# Patient Record
Sex: FEMALE | Race: BLACK OR AFRICAN AMERICAN | Employment: OTHER | URBAN - METROPOLITAN AREA
[De-identification: names, ages, dates, MRNs, and addresses within clinical notes are randomized per-mention and may not be internally consistent; named-entity substitution may affect disease eponyms.]

---

## 2017-03-25 ENCOUNTER — APPOINTMENT (OUTPATIENT)
Dept: GENERAL RADIOLOGY | Facility: CLINIC | Age: 37
End: 2017-03-25
Attending: INTERNAL MEDICINE
Payer: COMMERCIAL

## 2017-03-25 ENCOUNTER — HOSPITAL ENCOUNTER (EMERGENCY)
Facility: CLINIC | Age: 37
Discharge: HOME | End: 2017-03-25
Attending: INTERNAL MEDICINE | Admitting: INTERNAL MEDICINE
Payer: COMMERCIAL

## 2017-03-25 VITALS
HEIGHT: 64 IN | WEIGHT: 150 LBS | SYSTOLIC BLOOD PRESSURE: 105 MMHG | HEART RATE: 78 BPM | TEMPERATURE: 98 F | DIASTOLIC BLOOD PRESSURE: 66 MMHG | RESPIRATION RATE: 16 BRPM | OXYGEN SATURATION: 100 % | BODY MASS INDEX: 25.61 KG/M2

## 2017-03-25 DIAGNOSIS — M72.2 PLANTAR FASCIITIS: ICD-10-CM

## 2017-03-25 PROCEDURE — 99284 EMERGENCY DEPT VISIT MOD MDM: CPT

## 2017-03-25 PROCEDURE — 25000132 ZZH RX MED GY IP 250 OP 250 PS 637: Performed by: INTERNAL MEDICINE

## 2017-03-25 PROCEDURE — 73630 X-RAY EXAM OF FOOT: CPT | Mod: LT

## 2017-03-25 RX ORDER — HYDROCODONE BITARTRATE AND ACETAMINOPHEN 5; 325 MG/1; MG/1
1-2 TABLET ORAL EVERY 4 HOURS PRN
Qty: 15 TABLET | Refills: 0 | Status: SHIPPED | OUTPATIENT
Start: 2017-03-25

## 2017-03-25 RX ORDER — IBUPROFEN 800 MG/1
800 TABLET, FILM COATED ORAL EVERY 8 HOURS PRN
Qty: 15 TABLET | Refills: 0 | Status: SHIPPED | OUTPATIENT
Start: 2017-03-25

## 2017-03-25 RX ORDER — IBUPROFEN 600 MG/1
600 TABLET, FILM COATED ORAL ONCE
Status: COMPLETED | OUTPATIENT
Start: 2017-03-25 | End: 2017-03-25

## 2017-03-25 RX ADMIN — IBUPROFEN 600 MG: 600 TABLET, FILM COATED ORAL at 12:59

## 2017-03-25 NOTE — ED NOTES
Patient complaining of left leg pain from the hip down to the foot.  Started with some pain yesterday but today is unable to bear weight at all. Denies any trauma.  Denies previous back injury.  Denies any recent long car or plane trips.     ABCs intact.  Alert and oriented x 3.

## 2017-03-25 NOTE — ED PROVIDER NOTES
"  History     Chief Complaint:  Foot and Ankle Pain      HPI   Liliana Pal is a 37 year old female who presents with foot and ankle pain. The patient reports the onset of pain in her left foot and ankle yesterday that has worsened today. She reports that she has been exercising on the treadmill for the last few days, but she denies injury or sudden onset of pain while exercising. She has been able to ambulate, however she reports exacerbation of her pain when bearing weight with radiation of her pain up her leg. She has not taken pain medication at home. Patient is otherwise healthy.    Allergies:  NKDA     Medications:    Tramadol    Past Medical History:    History reviewed. No pertinent past medical history.      Past Surgical History:     section    Family History:    History reviewed. No pertinent family history.      Social History:  Marital status:   Never smoker, negative for alcohol use.  The patient presents with .    Review of Systems   Musculoskeletal:        Positive for left foot and ankle pain.   All other systems reviewed and are negative.    Physical Exam   First Vitals:  BP: 104/64  Pulse: 78  Temp: 98  F (36.7  C)  Resp: 16  Height: 162.6 cm (5' 4\")  Weight: 68 kg (150 lb)  SpO2: 100 %      Physical Exam   Constitutional: She is cooperative.   Cardiovascular: Normal pulses.    Musculoskeletal:        Left foot: There is tenderness. There is no swelling and no deformity.   Marked tenderness over origin of plantar fascia  No ecchymosis, swelling, deformity  Some tenderness over lateral ankle ligaments   Neurological: She is alert. No sensory deficit.   Skin: No abrasion, no ecchymosis and no laceration noted.       Emergency Department Course     Imaging:  Radiographic findings were communicated with the patient who voiced understanding of the findings.    Left Foot XR per radiology:   Negative.     Interventions:  1259: Ibuprofen, 600 mg, PO     ED Course:  Nursing notes " and vitals reviewed.  I performed an exam of the patient as documented above.     1305: I checked in with and updated the patient.    I personally reviewed the imaging results with the patient and answered all related questions prior to discharge.   Findings and plan explained to the patient. Patient discharged home with instructions regarding supportive care, medications, and reasons to return. The importance of close follow-up was reviewed.     Impression & Plan      Medical Decision Making:  Liliana Pal is a 37 year old female who recently began treadmill exercising and presents with marked pain in the left foot and ankle. On exam, she has exquisite tenderness over the plantar fascia and with her history of pain essentially only with weightbearing that is suggestive of that diagnosis. She does additionally have some tenderness about the lateral ligaments of the ankle and there may be some element of sprain. As noted, we have placed her in a CAM walker boot with crutches as needed. I will have her on ibuprofen with Norco for pain, ice and elevate, follow up with podiatry early next week.    Diagnosis:    ICD-10-CM    1. Plantar fasciitis M72.2      Disposition:   Discharge to home with primary care and podiatry follow up.     Discharge Medications:   New Prescriptions    HYDROCODONE-ACETAMINOPHEN (NORCO) 5-325 MG PER TABLET    Take 1-2 tablets by mouth every 4 hours as needed for moderate to severe pain    IBUPROFEN (ADVIL/MOTRIN) 800 MG TABLET    Take 1 tablet (800 mg) by mouth every 8 hours as needed for moderate pain     Lindsay BRISENO, michelle serving as a scribe on 3/25/2017 at 12:14 PM to personally document services performed by Neena Porter MD, based on my observations and the provider's statements to me.           Neena Porter MD  03/25/17 1606

## 2017-03-25 NOTE — DISCHARGE INSTRUCTIONS

## 2017-03-25 NOTE — ED AVS SNAPSHOT
[facility] Emergency Department    [address]    [facility] [address]    Phone:  [phone]    Fax:  [phone]                                       Liliana Pal   MRN: [id number]    Department:  [facility] Emergency Department   Date of Visit:  3/25/2017           Patient Information     Date Of Birth          1980        Your diagnoses for this visit were:     Plantar fasciitis        You were seen by Neena Porter MD.      Follow-up Information     Follow up with Rachell Church DPM, Podiatry/Foot and Ankle Surgery In 3 days.    Specialty:  Podiatry    Contact information:     [facility]  [address]  [address]ON[address]  [facility] [id number] [phone]          Discharge Instructions         Plantar Fasciitis  Plantar fasciitis is a painful swelling of the plantar fascia. The plantar fascia is a thick, fibrous layer of tissue. It covers the bones on the bottom of your foot. And it supports the foot bones in an arched position.  This can happen gradually or suddenly. It usually affects one foot at a time. Heel pain can be sharp, like a knife sticking into the bottom of your foot. You may feel pain after exercising, long-distance jogging, stair climbing, long periods of standing, or after standing up.  Risk factors include: non-active lifestyle, arthritis, diabetes, obesity or recent weight gain, flat foot, high arch. Wearing high heels, loose shoes, or shoes with poor arch support for long periods of time adds to the risk. This problem is commonly found in runners and dancers. It also found in people who stand on hard surfaces for long periods of time.    Foot pain from this condition is usually worse in the morning. But it improves with walking. By the end of the day there may be a dull aching. Treatment requires short-term rest and controlling swelling. It may take up to 9 months before all symptoms go away. Rarely, a steroid injection into  the foot, or surgery, may be needed.  Home care    If you are overweight, lose weight to help healing.    Choose supportive shoes with good arch support and shock absorbency. Replace athletic shoes when they become worn out. Don t walk or run barefoot.    Premade or custom-fitted shoe inserts may be helpful. Inserts made of silicone seem to be the most effective. Custom-made inserts can be provided by a podiatrist or foot specialist, physical therapist, or orthopedist.    Premade or custom-made night splints keep the heel stretched out while you sleep. They may prevent morning pain.    Avoid activities that stress the feet: jogging, prolonged standing or walking, contact sports, etc.    First thing in the morning and before sports, stretch the bottom of your feet. Gently flex your ankle so the toes move toward your knee.    Icing may help control heel pain. Apply an ice pack to the heel for 10-20 minutes as a preventive. Or ice your heel after a severe flare-up of symptoms. You may repeat this every 1-2 hours as needed.    You may use over-the-counter pain medicine to control pain, unless another medicine was prescribed. Anti-inflammatory pain medicines, such as ibuprofen or naproxen, may work better than acetaminophen. If you have chronic liver or kidney disease or ever had a stomach ulcer or GI bleeding, talk with your healthcare provider before using these medicines.  Follow-up care   Follow up with your healthcare provider, physical therapist, or podiatrist or foot specialist as advised.  Call for an appointment if pain worsens or there is no relief after a few weeks of home treatment. Shoe inserts, a night splint, or a special boot may be required.  If X-rays were taken, you will be told of any new findings that may affect your care.  When to seek medical advice  Call your healthcare provider right away if any of these occur:     Foot swelling    Redness with increasing pain    3480-8478 [employer],  [employer]. [address] [place] [address]92. All rights reserved. This information is not intended as a substitute for professional medical care. Always follow your healthcare professional's instructions.          24 Hour Appointment Hotline       To make an appointment at any [facility], call [phone] ([phone]). If you don't have a family doctor or clinic, we will help you find one. [facility] clinics are conveniently located to serve the needs of you and your family.             Review of your medicines      START taking        Dose / Directions Last dose taken    HYDROcodone-acetaminophen 5-325 MG per tablet   Commonly known as:  NORCO   Dose:  1-2 tablet   Quantity:  15 tablet        Take 1-2 tablets by mouth every 4 hours as needed for moderate to severe pain   Refills:  0        ibuprofen 800 MG tablet   Commonly known as:  ADVIL/MOTRIN   Dose:  800 mg   Quantity:  15 tablet        Take 1 tablet (800 mg) by mouth every 8 hours as needed for moderate pain   Refills:  0          Our records show that you are taking the medicines listed below. If these are incorrect, please call your family doctor or clinic.        Dose / Directions Last dose taken    NO ACTIVE MEDICATIONS        Refills:  0        prenatal multivitamin  plus iron 27-0.8 MG Tabs per tablet   Dose:  1 tablet        Take 1 tablet by mouth daily.   Refills:  0        traMADol 50 MG tablet   Commonly known as:  ULTRAM   Dose:  1-2 tablet   Quantity:  15 tablet        Take 1-2 tablets by mouth every 6 hours as needed for pain.   Refills:  0                Prescriptions were sent or printed at these locations (2 Prescriptions)                   Other Prescriptions                Printed at Department/Unit printer (2 of 2)         ibuprofen (ADVIL/MOTRIN) 800 MG tablet               HYDROcodone-acetaminophen (NORCO) 5-325 MG per tablet                Procedures and tests performed during your visit     Foot XR, G/E 3 views, left       Orders Needing Specimen Collection     None      Pending Results     No orders found from 3/23/2017 to 3/26/2017.            Pending Culture Results     No orders found from 3/23/2017 to 3/26/2017.             Test Results from your hospital stay     3/25/2017 12:38 PM - Interface, Radiant Ib      Narrative     XR FOOT LT G/E 3 VW  3/25/2017 12:28 PM    HISTORY:  foot pain    COMPARISON:  None.        Impression     IMPRESSION:  Negative.      RADHA WEBBER MD                Clinical Quality Measure: Blood Pressure Screening     Your blood pressure was checked while you were in the emergency department today. The last reading we obtained was  BP: 105/66 . Please read the guidelines below about what these numbers mean and what you should do about them.  If your systolic blood pressure (the top number) is less than 120 and your diastolic blood pressure (the bottom number) is less than 80, then your blood pressure is normal. There is nothing more that you need to do about it.  If your systolic blood pressure (the top number) is 120-139 or your diastolic blood pressure (the bottom number) is 80-89, your blood pressure may be higher than it should be. You should have your blood pressure rechecked within a year by a primary care provider.  If your systolic blood pressure (the top number) is 140 or greater or your diastolic blood pressure (the bottom number) is 90 or greater, you may have high blood pressure. High blood pressure is treatable, but if left untreated over time it can put you at risk for heart attack, stroke, or kidney failure. You should have your blood pressure rechecked by a primary care provider within the next 4 weeks.  If your provider in the emergency department today gave you specific instructions to follow-up with your doctor or provider even sooner than that, you should follow that instruction and not wait for up to 4 weeks for your follow-up visit.        Thank you for choosing [facility]      "  Thank you for choosing [facility] for your care. Our goal is always to provide you with excellent care. Hearing back from our patients is one way we can continue to improve our services. Please take a few minutes to complete the written survey that you may receive in the mail after you visit with us. Thank you!        [facility] [employer] Information     [employer] lets you send messages to your doctor, view your test results, renew your prescriptions, schedule appointments and more. To sign up, go to [email or web address] . Click on \"Log in\" on the left side of the screen, which will take you to the Welcome page. Then click on \"Sign up Now\" on the right side of the page.     You will be asked to enter the access code listed below, as well as some personal information. Please follow the directions to create your username and password.     Your access code is: [email or web address]  Expires: 2017  1:19 PM     Your access code will  in 90 days. If you need help or a new code, please call your [facility] clinic or [phone].        Care EveryWhere ID     This is your Care EveryWhere ID. This could be used by other organizations to access your [facility] medical records  [id number]        After Visit Summary       This is your record. Keep this with you and show to your community pharmacist(s) and doctor(s) at your next visit.                  "

## 2017-03-25 NOTE — ED AVS SNAPSHOT
[facility] Emergency Department    [address]    [facility] [address]    Phone:  [phone]    Fax:  [phone]                                       Liliana Pal   MRN: [id number]    Department:  [facility] Emergency Department   Date of Visit:  3/25/2017           After Visit Summary Signature Page     I have received my discharge instructions, and my questions have been answered. I have discussed any challenges I see with this plan with the nurse or doctor.    ..........................................................................................................................................  Patient/Patient Representative Signature      ..........................................................................................................................................  Patient Representative Print Name and Relationship to Patient    ..................................................               ................................................  Date                                            Time    ..........................................................................................................................................  Reviewed by Signature/Title    ...................................................              ..............................................  Date                                                            Time

## 2017-04-07 ENCOUNTER — OFFICE VISIT (OUTPATIENT)
Dept: PODIATRY | Facility: CLINIC | Age: 37
End: 2017-04-07
Payer: COMMERCIAL

## 2017-04-07 VITALS
HEIGHT: 64 IN | BODY MASS INDEX: 25.61 KG/M2 | DIASTOLIC BLOOD PRESSURE: 66 MMHG | WEIGHT: 150 LBS | SYSTOLIC BLOOD PRESSURE: 112 MMHG

## 2017-04-07 DIAGNOSIS — M25.572 ACUTE LEFT ANKLE PAIN: Primary | ICD-10-CM

## 2017-04-07 DIAGNOSIS — M54.32 SCIATICA, LEFT SIDE: ICD-10-CM

## 2017-04-07 DIAGNOSIS — Q66.70 PES CAVUS, CONGENITAL: ICD-10-CM

## 2017-04-07 PROCEDURE — 99203 OFFICE O/P NEW LOW 30 MIN: CPT | Performed by: PODIATRIST

## 2017-04-07 NOTE — LETTER
4/7/2017       RE: Liliana Pal  [address] [place] [address]           Dear Colleague,    Thank you for referring your patient, Liliana Pal, to the [facility]. Please see a copy of my visit note below.    PATIENT HISTORY:  Liliana Pal is a 37 year old female who presents to clinic for pain to the side of the left foot that went all the way up to the hip. Started on Friday and she notes that Saturday she couldn't walk. Was seen in ER. She has been doing Thera care over the counter to the foot and notes that it helped. Does not have any pain today. Denies injury. Is wondering what caused the pain.     Review of Systems:  Patient denies fever, chills, rash, wound, stiffness, limping, numbness, weakness, heart burn, blood in stool, chest pain with activity, calf pain when walking, shortness of breath with activity, chronic cough, easy bleeding/bruising, swelling of ankles, excessive thirst, fatigue, depression, anxiety.      PAST MEDICAL HISTORY: History reviewed. No pertinent past medical history.     PAST SURGICAL HISTORY:   Past Surgical History:   Procedure Laterality Date     GYN SURGERY      c-sect        MEDICATIONS:   Current Outpatient Prescriptions:      ibuprofen (ADVIL/MOTRIN) 800 MG tablet, Take 1 tablet (800 mg) by mouth every 8 hours as needed for moderate pain (Patient not taking: Reported on 4/7/2017), Disp: 15 tablet, Rfl: 0     HYDROcodone-acetaminophen (NORCO) 5-325 MG per tablet, Take 1-2 tablets by mouth every 4 hours as needed for moderate to severe pain (Patient not taking: Reported on 4/7/2017), Disp: 15 tablet, Rfl: 0     Prenatal Vit-Fe Fumarate-FA (PRENATAL MULTIVITAMIN  PLUS IRON) 27-0.8 MG TABS, Take 1 tablet by mouth daily Reported on 4/7/2017, Disp: , Rfl:      NO ACTIVE MEDICATIONS, Reported on 4/7/2017, Disp: , Rfl:      tramadol (ULTRAM) 50 MG tablet, Take 1-2 tablets by mouth every 6 hours as needed for pain. (Patient not taking: Reported on  "4/7/2017), Disp: 15 tablet, Rfl: 0     ALLERGIES:  No Known Allergies     SOCIAL HISTORY:   Social History     Social History     Marital status:      Spouse name: N/A     Number of children: N/A     Years of education: N/A     Occupational History     Not on file.     Social History Main Topics     Smoking status: Never Smoker     Smokeless tobacco: Not on file     Alcohol use No     Drug use: No     Sexual activity: Not on file     Other Topics Concern     Not on file     Social History Narrative        FAMILY HISTORY: History reviewed. No pertinent family history.     EXAM:Vitals: /66  Ht 1.626 m (5' 4\")  Wt 68 kg (150 lb)  BMI 25.75 kg/m2  BMI= Body mass index is 25.75 kg/(m^2).    General appearance: Patient is alert and fully cooperative with history & exam.  No sign of distress is noted during the visit.     Psychiatric: Affect is pleasant & appropriate.  Patient appears motivated to improve health.     Respiratory: Breathing is regular & unlabored while sitting.     HEENT: Hearing is intact to spoken word.  Speech is clear.  No gross evidence of visual impairment that would impact ambulation.     Dermatologic: Skin is intact to both lower extremities without significant lesions, rash or abrasion.  No paronychia or evidence of soft tissue infection is noted.     Vascular: DP & PT pulses are intact & regular bilaterally.  No significant edema or varicosities noted.  CFT and skin temperature is normal to both lower extremities.     Neurologic: Lower extremity sensation is intact to light touch.  No evidence of weakness or contracture in the lower extremities.  No evidence of neuropathy.     Musculoskeletal: Patient is ambulatory without assistive device or brace.  Increase arch height. No pain onpalpation of foot or ankle. Notes it was by the sinus tarsi when it occurred. Muscle strength is 5/5.      ASSESSMENT:    Acute left ankle pain  Sciatica, left side  Pes cavus, congenital     PLAN:  " Reviewed patient's chart in epic. At this time, she was given information on inserts. If pain recurs and involves the hip, recommend sports medicine or ortho referral for sciatica.     Talked about sinus tarsitis which is early arthritis to the sinus tarsi joint. Talked about treatments including orthotics, icing, compression, NSAIDs, injection, physical therapy with iontophoresis, immobilization, compounding pain cream, permanent ankle bracing, MRI to assess for need for fusion.    Will call with questions or concerns.        Rachell Church DPM, Podiatry/Foot and Ankle Surgery    Weight management plan: Patient was referred to their PCP to discuss a diet and exercise plan.        Again, thank you for allowing me to participate in the care of your patient.        Sincerely,              Rachell Church DPM, Podiatry/Foot and Ankle Surgery

## 2017-04-07 NOTE — PROGRESS NOTES
PATIENT HISTORY:  Liliana Pal is a 37 year old female who presents to clinic for pain to the side of the left foot that went all the way up to the hip. Started on Friday and she notes that Saturday she couldn't walk. Was seen in ER. She has been doing Thera care over the counter to the foot and notes that it helped. Does not have any pain today. Denies injury. Is wondering what caused the pain.     Review of Systems:  Patient denies fever, chills, rash, wound, stiffness, limping, numbness, weakness, heart burn, blood in stool, chest pain with activity, calf pain when walking, shortness of breath with activity, chronic cough, easy bleeding/bruising, swelling of ankles, excessive thirst, fatigue, depression, anxiety.      PAST MEDICAL HISTORY: History reviewed. No pertinent past medical history.     PAST SURGICAL HISTORY:   Past Surgical History:   Procedure Laterality Date     GYN SURGERY      c-sect        MEDICATIONS:   Current Outpatient Prescriptions:      ibuprofen (ADVIL/MOTRIN) 800 MG tablet, Take 1 tablet (800 mg) by mouth every 8 hours as needed for moderate pain (Patient not taking: Reported on 4/7/2017), Disp: 15 tablet, Rfl: 0     HYDROcodone-acetaminophen (NORCO) 5-325 MG per tablet, Take 1-2 tablets by mouth every 4 hours as needed for moderate to severe pain (Patient not taking: Reported on 4/7/2017), Disp: 15 tablet, Rfl: 0     Prenatal Vit-Fe Fumarate-FA (PRENATAL MULTIVITAMIN  PLUS IRON) 27-0.8 MG TABS, Take 1 tablet by mouth daily Reported on 4/7/2017, Disp: , Rfl:      NO ACTIVE MEDICATIONS, Reported on 4/7/2017, Disp: , Rfl:      tramadol (ULTRAM) 50 MG tablet, Take 1-2 tablets by mouth every 6 hours as needed for pain. (Patient not taking: Reported on 4/7/2017), Disp: 15 tablet, Rfl: 0     ALLERGIES:  No Known Allergies     SOCIAL HISTORY:   Social History     Social History     Marital status:      Spouse name: N/A     Number of children: N/A     Years of education: N/A  "    Occupational History     Not on file.     Social History Main Topics     Smoking status: Never Smoker     Smokeless tobacco: Not on file     Alcohol use No     Drug use: No     Sexual activity: Not on file     Other Topics Concern     Not on file     Social History Narrative        FAMILY HISTORY: History reviewed. No pertinent family history.     EXAM:Vitals: /66  Ht 1.626 m (5' 4\")  Wt 68 kg (150 lb)  BMI 25.75 kg/m2  BMI= Body mass index is 25.75 kg/(m^2).    General appearance: Patient is alert and fully cooperative with history & exam.  No sign of distress is noted during the visit.     Psychiatric: Affect is pleasant & appropriate.  Patient appears motivated to improve health.     Respiratory: Breathing is regular & unlabored while sitting.     HEENT: Hearing is intact to spoken word.  Speech is clear.  No gross evidence of visual impairment that would impact ambulation.     Dermatologic: Skin is intact to both lower extremities without significant lesions, rash or abrasion.  No paronychia or evidence of soft tissue infection is noted.     Vascular: DP & PT pulses are intact & regular bilaterally.  No significant edema or varicosities noted.  CFT and skin temperature is normal to both lower extremities.     Neurologic: Lower extremity sensation is intact to light touch.  No evidence of weakness or contracture in the lower extremities.  No evidence of neuropathy.     Musculoskeletal: Patient is ambulatory without assistive device or brace.  Increase arch height. No pain onpalpation of foot or ankle. Notes it was by the sinus tarsi when it occurred. Muscle strength is 5/5.      ASSESSMENT:    Acute left ankle pain  Sciatica, left side  Pes cavus, congenital     PLAN:  Reviewed patient's chart in epic. At this time, she was given information on inserts. If pain recurs and involves the hip, recommend sports medicine or ortho referral for sciatica.     Talked about sinus tarsitis which is early " arthritis to the sinus tarsi joint. Talked about treatments including orthotics, icing, compression, NSAIDs, injection, physical therapy with iontophoresis, immobilization, compounding pain cream, permanent ankle bracing, MRI to assess for need for fusion.    Will call with questions or concerns.        Rachell Church DPM, Podiatry/Foot and Ankle Surgery    Weight management plan: Patient was referred to their PCP to discuss a diet and exercise plan.

## 2017-04-07 NOTE — MR AVS SNAPSHOT
After Visit Summary   2017    Liliana Pal    MRN: [id number]           Patient Information     Date Of Birth          1980        Visit Information        Provider Department      2017 3:45 PM Rachell Church DPM, Podiatry/Foot [facility]le [facility]        Care Instructions    DR. CHURCH'S CLINIC SCHEDULE     [facility]  [address] [place] [address]  P: [phone]  F: [phone] [facility]  [address] [place] [address]  P: [phone]  F: [phone] [facility]  [address] [place] [address]  P: [phone]  F: [phone]   FRIDAY AM FRIDAY PM SURGERY   [facility]     [facility] [place]  [address]  [place] [address]  P: [phone] [facility]  [address]  [place] [address]  P: [phone]  F: [phone] Surgery [phone]   Appointment [phone] General After Hours:  [phone] Patient [phone]             Body Mass Index (BMI)  Many things can cause foot and ankle problems. Foot structure, activity level, foot mechanics and injuries are common causes of pain.    One very important issue that often goes unmentioned, is body weight.  Extra weight can cause increased stress on muscles, ligaments, bones and tendons.  Sometimes just a few extra pounds is all it takes to put one over her/his threshold.   Without reducing that stress, it can be difficult to alleviate pain.      Some people are uncomfortable addressing this issue, but we feel it is important for you to think about it.  As Foot &  Ankle specialists, our job is addressing the lower extremity problem and possible causes.     Regarding extra body weight, we encourage patients to discuss diet and weight management plans with their primary care doctors.  It is this team approach that gives you  the best opportunity for pain relief and getting you back on your feet.        SCIATICA  Sciatica is a common type of pain affecting the sciatic nerve, a large nerve extending from the lower back down the back of each leg.  SYMPTOMS  Common symptoms of sciatica include:  Pain in the rear or leg that is worse when sitting   Burning or tingling down the leg   Weakness, numbness, or difficulty moving the leg or foot   A constant pain on one side of the rear   A shooting pain that makes it difficult to stand up  Sciatica usually affects only one side of the lower body. Often, the pain extends from the lower back all the way through the back of the thigh and down through the leg. Depending on where the sciatic nerve is affected, the pain may also extend to the foot or toes.  For some people, the pain from sciatica can be severe and debilitating. For others, the sciatica pain might be infrequent and irritating, but has the potential to get worse.  Seek immediate medical attention if you have progressive lower extremity weakness, numbness in the upper thighs, and/or loss of bladder or bowel control.  CAUSES  Sciatica is caused by irritation of the root(s) of the lower lumbar and lumbosacral spine.  Additional common causes of sciatica include:  Lumbar spinal stenosis (narrowing of the spinal canal in the lower back)   Degenerative disc disease (breakdown of discs, which act as cushions between the vertebrae)   Spondylolisthesis (a condition in which one vertebra slips forward over another one)   Pregnancy  Other things that may make your back pain worse include being overweight, not exercising regularly, wearing high heels, or sleeping on a mattress that is too soft.  TREATMENT  If your pain doesn't improve with self-care measures, your doctor may suggest some of the following treatments.   Medications  The types of drugs that might be prescribed for sciatica pain include:   Anti-inflammatories   Muscle relaxants    Narcotics   Tricyclic antidepressants   Anti-seizure medications   Physical Therapy  Once your acute pain improves, your doctor or a physical therapist can design a rehabilitation program to help you prevent recurrent injuries. This typically includes exercises to help correct your posture, strengthen the muscles supporting your back and improve your flexibility.   Steroid Injections  In some cases, your doctor may recommend injection of a corticosteroid medication into the area around the involved nerve root. Corticosteroids help reduce pain by suppressing inflammation around the irritated nerve. The effects usually wear off in a few months. The number of steroid injections you can receive is limited because the risk of serious side effects increases when the injections occur too frequently.   For most people, sciatica responds well to self-care measures. You'll heal more quickly if you continue with your usual activities but avoid what may have triggered the pain in the first place. Although resting for a day or so may provide some relief, prolonged inactivity will make your signs and symptoms worse.   Other self-care treatments that may be helpful include:   Cold packs. Initially, you may get relief from a cold pack placed on the painful area for up 20 minutes several times a day. Use an ice pack or a package of frozen peas wrapped in a clean towel.   Hot packs. After two to three days, apply heat to the areas that hurt. Use hot packs, a heat lamp or a heating pad on the lowest setting. If you continue to have pain, try alternating warm and cold packs.   Stretching. Stretching exercises for your low back can help you feel better and may help relieve nerve root compression. Avoid jerking, bouncing or twisting during the stretch and try to hold the stretch at least 30 seconds.   Over-the-counter medications. Pain relievers such as ibuprofen (Advil, Motrin, others) and naproxen (Aleve) are sometimes helpful for  sciatica.   SURGERY  This option is usually reserved for times when the compressed nerve causes significant weakness, bowel or bladder incontinence or when you have pain that progressively worsens or doesn't improve with other therapies. Surgeons can remove the bone spur or the portion of the herniated disk that's pressing on the pinched nerve.     PREVENTION  It's not always possible to prevent sciatica, and the condition may recur. The following suggestions can play a key role in protecting your back:   Exercise Regularly: This is the most important thing you can do for your overall health as well as for your back. Pay special attention to your core muscles -- the muscles in your abdomen and lower back that are essential for proper posture and alignment. Ask your doctor to recommend specific activities.   Proper Posture: Choose a seat with good lower back support, arm rests and a swivel base. Consider placing a pillow or rolled towel in the small of your back to maintain its normal curve. Keep your knees and hips level.   Body Mechanics: If you stand for long periods, rest one foot on a stool or small box from time to time. When you lift something heavy, let your lower extremities do the work. Move straight up and down. Keep your back straight and bend only at the knees. Hold the load close to your body. Avoid lifting and twisting simultaneously. Find a lifting partner if the object is heavy or awkward.     STRETCHES  Lying Hamstring Stretch  Regular stretching of the hamstrings can be beneficial in relieving sciatica pain, according to Charbel Boone, a physical therapist. The hamstrings consist of a group of muscles located at the backs of your upper thighs. Tight, stiff hamstrings can lead to lower back pain and/or sciatica, states Paolo. An effective stretch starts with you lying on your back with your right leg extended forward. Lift your left leg, place your hands behind your knee and bend and pull your knee  "toward your torso until you feel a stretch at the back of your thigh. Slowly straighten your knee to further the stretch. Pause for 10 seconds, relax and repeat with your right leg. Repeat 10 to 12 times. Gradually increase your time to 30 seconds per leg.Seated Hamstring Stretch  To perform the seated hamstring stretch, sit near the front edge of a sturdy chair that has no wheels. Extend your legs, keep your knees straight and rest your heels on the floor. While keeping your spine straight, reach forward toward your toes until you feel a stretch in the back of your upper legs. Hold the stretch for 30 seconds and relax.Repeat 10 times.  Downward-Facing-Dog  The downward-facing dog is a classic yoga pose that can help you reduce sciatica pain by stretching your hamstrings and lower back. To perform this pose, get on your hands and knees, with your knees below your hips and your hands slightly ahead of your shoulders. Tuck your toes under, press your hands into the floor and lift your knees off the floor. Straighten your legs as far as you can until you feel the stretch in your hamstrings. Your body should look like an upside-down \"V.\" Avoid letting your head sag. Keep it between your upper arms. Hold the pose for 10 seconds and return to the starting position. Repeat up to 10 times. Gradually work your way up to 30-second holds.  Extended [place]  Relieve sciatica pain with the extended triangle pose. Spread your legs and stand with your feet more than shoulder-width apart. Turn your left foot 45 degrees out to the left and keep your right foot pointing straight ahead. Extend and hold your arms out to your sides, parallel to the floor, with your palms down. Exhale as you reach out to the left with your left hand. Bend your left hip, lean your torso to the left side over your left leg and grab your left ankle. Rotate your left ribcage toward the ceiling. Stretch your right hand toward the ceiling and keep your head " "in a neutral position. Hold the pose for 30 seconds, inhale, reverse your movements and return to the standing position. Reposition your feet and repeat toward the right            Follow-ups after your visit        Who to contact     If you have questions or need follow up information about today's clinic visit or your schedule please contact [facility] directly at [phone].  Normal or non-critical lab and imaging results will be communicated to you by FitLinxxhart, letter or phone within 4 business days after the clinic has received the results. If you do not hear from us within 7 days, please contact the clinic through FitLinxxhart or phone. If you have a critical or abnormal lab result, we will notify you by phone as soon as possible.  Submit refill requests through Kanmu or call your pharmacy and they will forward the refill request to us. Please allow 3 business days for your refill to be completed.          Additional Information About Your Visit        FitLinxxharGaudena Information     Kanmu lets you send messages to your doctor, view your test results, renew your prescriptions, schedule appointments and more. To sign up, go to [email or web address] . Click on \"Log in\" on the left side of the screen, which will take you to the Welcome page. Then click on \"Sign up Now\" on the right side of the page.     You will be asked to enter the access code listed below, as well as some personal information. Please follow the directions to create your username and password.     Your access code is: [email or web address]  Expires: 2017  1:19 PM     Your access code will  in 90 days. If you need help or a new code, please call your [place] clinic or [phone].        Care EveryWhere ID     This is your Care EveryWhere ID. This could be used by other organizations to access your [place] medical records  [id number]        Your Vitals Were     Height BMI (Body Mass Index)                5' 4\" (1.626 m) 25.75 " kg/m2           Blood Pressure from Last 3 Encounters:   04/07/17 112/66   03/25/17 105/66   04/07/13 110/78    Weight from Last 3 Encounters:   04/07/17 150 lb (68 kg)   03/25/17 150 lb (68 kg)   04/07/13 158 lb (71.7 kg)              Today, you had the following     No orders found for display       Primary Care Provider Office Phone # Fax #    Puja Ramirez [phone]       [facility] [address] [place] [address]        Thank you!     Thank you for choosing [facility]  for your care. Our goal is always to provide you with excellent care. Hearing back from our patients is one way we can continue to improve our services. Please take a few minutes to complete the written survey that you may receive in the mail after your visit with us. Thank you!             Your Updated Medication List - Protect others around you: Learn how to safely use, store and throw away your medicines at www.disposemymeds.org.          This list is accurate as of: 4/7/17  4:23 PM.  Always use your most recent med list.                   Brand Name Dispense Instructions for use    HYDROcodone-acetaminophen 5-325 MG per tablet    NORCO    15 tablet    Take 1-2 tablets by mouth every 4 hours as needed for moderate to severe pain       ibuprofen 800 MG tablet    ADVIL/MOTRIN    15 tablet    Take 1 tablet (800 mg) by mouth every 8 hours as needed for moderate pain       NO ACTIVE MEDICATIONS      Reported on 4/7/2017       prenatal multivitamin  plus iron 27-0.8 MG Tabs per tablet      Take 1 tablet by mouth daily Reported on 4/7/2017       traMADol 50 MG tablet    ULTRAM    15 tablet    Take 1-2 tablets by mouth every 6 hours as needed for pain.

## 2017-04-07 NOTE — NURSING NOTE
"Chief Complaint   Patient presents with     Foot Problems     pain on left foot pain was on the bottom and top of the arch area of foot, pt does not feel any discomfort anymore        Initial /66  Ht 5' 4\" (1.626 m)  Wt 150 lb (68 kg)  BMI 25.75 kg/m2 Estimated body mass index is 25.75 kg/(m^2) as calculated from the following:    Height as of this encounter: 5' 4\" (1.626 m).    Weight as of this encounter: 150 lb (68 kg).  Medication Reconciliation: complete   Mundo Del Valle MA  '    "

## 2017-04-07 NOTE — PATIENT INSTRUCTIONS
DR. BETH'S CLINIC SCHEDULE     [facility]  [address] [place] [address]  P: [phone]  F: [phone] [facility]  [address] [place] [address]  P: [phone]  F: [phone] [facility]  [address] [address]  P: [phone]  F: [phone]   FRIDAY AM FRIDAY PM SURGERY   [facility]     [facility] [place]  [address]  [place] [address]  P: [phone] [facility]  [address]  [place] [address]  P: [phone]  F: [phone] Surgery [phone]   Appointment [phone] General After Hours:  [phone] Patient [phone]             Body Mass Index (BMI)  Many things can cause foot and ankle problems. Foot structure, activity level, foot mechanics and injuries are common causes of pain.    One very important issue that often goes unmentioned, is body weight.  Extra weight can cause increased stress on muscles, ligaments, bones and tendons.  Sometimes just a few extra pounds is all it takes to put one over her/his threshold.   Without reducing that stress, it can be difficult to alleviate pain.      Some people are uncomfortable addressing this issue, but we feel it is important for you to think about it.  As Foot &  Ankle specialists, our job is addressing the lower extremity problem and possible causes.     Regarding extra body weight, we encourage patients to discuss diet and weight management plans with their primary care doctors.  It is this team approach that gives you the best opportunity for pain relief and getting you back on your feet.        SCIATICA  Sciatica is a common type of pain affecting the sciatic nerve, a large nerve extending from the lower back down the back of each leg.  SYMPTOMS  Common symptoms of sciatica include:  Pain in the rear or leg that is worse when sitting   Burning  or tingling down the leg   Weakness, numbness, or difficulty moving the leg or foot   A constant pain on one side of the rear   A shooting pain that makes it difficult to stand up  Sciatica usually affects only one side of the lower body. Often, the pain extends from the lower back all the way through the back of the thigh and down through the leg. Depending on where the sciatic nerve is affected, the pain may also extend to the foot or toes.  For some people, the pain from sciatica can be severe and debilitating. For others, the sciatica pain might be infrequent and irritating, but has the potential to get worse.  Seek immediate medical attention if you have progressive lower extremity weakness, numbness in the upper thighs, and/or loss of bladder or bowel control.  CAUSES  Sciatica is caused by irritation of the root(s) of the lower lumbar and lumbosacral spine.  Additional common causes of sciatica include:  Lumbar spinal stenosis (narrowing of the spinal canal in the lower back)   Degenerative disc disease (breakdown of discs, which act as cushions between the vertebrae)   Spondylolisthesis (a condition in which one vertebra slips forward over another one)   Pregnancy  Other things that may make your back pain worse include being overweight, not exercising regularly, wearing high heels, or sleeping on a mattress that is too soft.  TREATMENT  If your pain doesn't improve with self-care measures, your doctor may suggest some of the following treatments.   Medications  The types of drugs that might be prescribed for sciatica pain include:   Anti-inflammatories   Muscle relaxants   Narcotics   Tricyclic antidepressants   Anti-seizure medications   Physical Therapy  Once your acute pain improves, your doctor or a physical therapist can design a rehabilitation program to help you prevent recurrent injuries. This typically includes exercises to help correct your posture, strengthen the muscles supporting your back and  improve your flexibility.   Steroid Injections  In some cases, your doctor may recommend injection of a corticosteroid medication into the area around the involved nerve root. Corticosteroids help reduce pain by suppressing inflammation around the irritated nerve. The effects usually wear off in a few months. The number of steroid injections you can receive is limited because the risk of serious side effects increases when the injections occur too frequently.   For most people, sciatica responds well to self-care measures. You'll heal more quickly if you continue with your usual activities but avoid what may have triggered the pain in the first place. Although resting for a day or so may provide some relief, prolonged inactivity will make your signs and symptoms worse.   Other self-care treatments that may be helpful include:   Cold packs. Initially, you may get relief from a cold pack placed on the painful area for up 20 minutes several times a day. Use an ice pack or a package of frozen peas wrapped in a clean towel.   Hot packs. After two to three days, apply heat to the areas that hurt. Use hot packs, a heat lamp or a heating pad on the lowest setting. If you continue to have pain, try alternating warm and cold packs.   Stretching. Stretching exercises for your low back can help you feel better and may help relieve nerve root compression. Avoid jerking, bouncing or twisting during the stretch and try to hold the stretch at least 30 seconds.   Over-the-counter medications. Pain relievers such as ibuprofen (Advil, Motrin, others) and naproxen (Aleve) are sometimes helpful for sciatica.   SURGERY  This option is usually reserved for times when the compressed nerve causes significant weakness, bowel or bladder incontinence or when you have pain that progressively worsens or doesn't improve with other therapies. Surgeons can remove the bone spur or the portion of the herniated disk that's pressing on the pinched  nerve.     PREVENTION  It's not always possible to prevent sciatica, and the condition may recur. The following suggestions can play a key role in protecting your back:   Exercise Regularly: This is the most important thing you can do for your overall health as well as for your back. Pay special attention to your core muscles   the muscles in your abdomen and lower back that are essential for proper posture and alignment. Ask your doctor to recommend specific activities.   Proper Posture: Choose a seat with good lower back support, arm rests and a swivel base. Consider placing a pillow or rolled towel in the small of your back to maintain its normal curve. Keep your knees and hips level.   Body Mechanics: If you stand for long periods, rest one foot on a stool or small box from time to time. When you lift something heavy, let your lower extremities do the work. Move straight up and down. Keep your back straight and bend only at the knees. Hold the load close to your body. Avoid lifting and twisting simultaneously. Find a lifting partner if the object is heavy or awkward.     STRETCHES  Lying Hamstring Stretch  Regular stretching of the hamstrings can be beneficial in relieving sciatica pain, according to Charbel Boone, a physical therapist. The hamstrings consist of a group of muscles located at the backs of your upper thighs. Tight, stiff hamstrings can lead to lower back pain and/or sciatica, states Paolo. An effective stretch starts with you lying on your back with your right leg extended forward. Lift your left leg, place your hands behind your knee and bend and pull your knee toward your torso until you feel a stretch at the back of your thigh. Slowly straighten your knee to further the stretch. Pause for 10 seconds, relax and repeat with your right leg. Repeat 10 to 12 times. Gradually increase your time to 30 seconds per leg.Seated Hamstring Stretch  To perform the seated hamstring stretch, sit near the  "front edge of a sturdy chair that has no wheels. Extend your legs, keep your knees straight and rest your heels on the floor. While keeping your spine straight, reach forward toward your toes until you feel a stretch in the back of your upper legs. Hold the stretch for 30 seconds and relax.Repeat 10 times.  Downward-Facing-Dog  The downward-facing dog is a classic yoga pose that can help you reduce sciatica pain by stretching your hamstrings and lower back. To perform this pose, get on your hands and knees, with your knees below your hips and your hands slightly ahead of your shoulders. Tuck your toes under, press your hands into the floor and lift your knees off the floor. Straighten your legs as far as you can until you feel the stretch in your hamstrings. Your body should look like an upside-down \"V.\" Avoid letting your head sag. Keep it between your upper arms. Hold the pose for 10 seconds and return to the starting position. Repeat up to 10 times. Gradually work your way up to 30-second holds.  Extended [place]  Relieve sciatica pain with the extended triangle pose. Spread your legs and stand with your feet more than shoulder-width apart. Turn your left foot 45 degrees out to the left and keep your right foot pointing straight ahead. Extend and hold your arms out to your sides, parallel to the floor, with your palms down. Exhale as you reach out to the left with your left hand. Bend your left hip, lean your torso to the left side over your left leg and grab your left ankle. Rotate your left ribcage toward the ceiling. Stretch your right hand toward the ceiling and keep your head in a neutral position. Hold the pose for 30 seconds, inhale, reverse your movements and return to the standing position. Reposition your feet and repeat toward the right      "

## 2021-07-22 VITALS
OXYGEN SATURATION: 99 % | DIASTOLIC BLOOD PRESSURE: 87 MMHG | TEMPERATURE: 97.5 F | RESPIRATION RATE: 18 BRPM | SYSTOLIC BLOOD PRESSURE: 113 MMHG | HEART RATE: 76 BPM

## 2021-07-22 PROCEDURE — 99283 EMERGENCY DEPT VISIT LOW MDM: CPT

## 2021-07-22 PROCEDURE — C9803 HOPD COVID-19 SPEC COLLECT: HCPCS

## 2021-07-22 PROCEDURE — 87635 SARS-COV-2 COVID-19 AMP PRB: CPT | Performed by: EMERGENCY MEDICINE

## 2021-07-23 ENCOUNTER — HOSPITAL ENCOUNTER (EMERGENCY)
Facility: CLINIC | Age: 41
Discharge: HOME | End: 2021-07-23
Attending: EMERGENCY MEDICINE | Admitting: EMERGENCY MEDICINE
Payer: COMMERCIAL

## 2021-07-23 DIAGNOSIS — J02.9 ACUTE PHARYNGITIS, UNSPECIFIED ETIOLOGY: ICD-10-CM

## 2021-07-23 LAB
CYCLE THRESHOLD (CT): NORMAL
DEPRECATED S PYO AG THROAT QL EIA: NEGATIVE
GROUP A STREP BY PCR: NOT DETECTED
Lab: NORMAL
SARS-COV-2 RNA RESP QL NAA+PROBE: NEGATIVE

## 2021-07-23 PROCEDURE — 250N000013 HC RX MED GY IP 250 OP 250 PS 637: Performed by: EMERGENCY MEDICINE

## 2021-07-23 PROCEDURE — 87880 STREP A ASSAY W/OPTIC: CPT | Performed by: EMERGENCY MEDICINE

## 2021-07-23 PROCEDURE — 87651 STREP A DNA AMP PROBE: CPT | Performed by: EMERGENCY MEDICINE

## 2021-07-23 RX ORDER — IBUPROFEN 600 MG/1
600 TABLET, FILM COATED ORAL ONCE
Status: COMPLETED | OUTPATIENT
Start: 2021-07-23 | End: 2021-07-23

## 2021-07-23 RX ADMIN — IBUPROFEN 600 MG: 600 TABLET, FILM COATED ORAL at 00:34

## 2021-07-23 ASSESSMENT — ENCOUNTER SYMPTOMS
COUGH: 0
HEADACHES: 1
SORE THROAT: 1
FEVER: 0

## 2021-07-23 NOTE — ED TRIAGE NOTES
Pt presents with cough and itchy throat that started last Friday. Pt feels feverish as well and has been taking tylenol. 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 ABCs intact. A&Ox3

## 2021-07-23 NOTE — ED PROVIDER NOTES
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 Denies fever or cough. She is Covid vaccinated.     Review of Systems   Constitutional: Negative for fever.   HENT: Positive for sore throat.    Respiratory: Negative for cough.    Neurological: Positive for headaches.   All other systems reviewed and are negative.      Allergies:  The patient has no known allergies.     Medications:  Synthroid  Omeprazole  Mirena    Past Medical History:    Group B step carrier  H. Pylori infection  PPD positive  Goiter  Hypotension  Perineal laceration    Past Surgical History:      Thyroidectomy    Social History:  Patient presents to the ED with her son.  Works in home health care.     Physical Exam     Patient Vitals for the past 24 hrs:   BP Temp Temp src Pulse Resp SpO2   21 2300 113/87 97.5  F (36.4  C) Temporal 76 18 99 %       Physical Exam  Constitutional:  Oriented to person, place, and time.  HENT:   Head:    Normocephalic.   Mouth/Throat:   Oropharynx is clear and moist. Mild oral posterior erythema. No exudate. No uvular deviation.   Eyes:    EOM are normal. Pupils are equal, round, and reactive to light.   Neck:    Neck supple. Bilateral anterior lymphadenopathy.  Cardiovascular:  Normal rate, regular rhythm and normal heart sounds.      Exam reveals no gallop and no friction rub.       No murmur heard.  Pulmonary/Chest:  Effort normal and breath sounds normal.      No respiratory distress. No wheezes. No rales.      No reproducible chest wall pain.  Abdominal:   Soft. No distension. No tenderness. No rebound and no guarding.   Musculoskeletal:  Normal range of motion.   Neurological:   Alert and oriented to person, place, and time.           Moves all 4 extremities spontaneously    Skin:    No rash noted. No pallor.     Emergency Department  Course   Laboratory:     Rapid strep screen: Negative    Beta group culture A strep: Pending    Symptomatic COVID-19 PCR: Pending    Emergency Department Course:    Reviewed:  I reviewed nursing notes, vitals, past medical history and care everywhere    Assessments:  0021 I obtained history and examined the patient as noted above.     0126 I rechecked the patient and explained findings.     Interventions:  0034 Advil 600 mg PO    Disposition:  The patient was discharged to home.     Impression & Plan     Medical Decision Making:  Liliana Pal is a 41 year old female who presents for evaluation of a sore throat and clinical evidence of pharyngitis. The rapid strep test is negative, and formal culture has been set up in the lab. Covid test pending at time of discharge.There is no clinical evidence of peritonsillar abscess, retropharyngeal abscess, Lemierre's Syndrome, epiglottis, or Du's angina. The etiology is most likely viral.   I have recommended treatment with analgesics, and we will await formal culture results.  If the culture is positive, an ED physician will call the patient to initiate anti-microbial therapy. Return if increasing pain, change in voice, neck pain, vomiting, fever, or shortness of breath. Follow-up with primary physician if not improving in 3-5 days. Given well appearance, I would not test further for other etiologies of serious bacterial infections.     Diagnosis:    ICD-10-CM    1. Acute pharyngitis, unspecified etiology  J02.9        Scribe Disclosure:  SUZANNA, Gino Schmidt, am serving as a scribe at 12:24 AM on 7/23/2021 to document services personally performed by Jessee Byrd MD based on my observations and the provider's statements to me.                Jessee Byrd MD  07/23/21 [id number]

## 2021-07-23 NOTE — RESULT ENCOUNTER NOTE
Group A Streptococcus PCR is NEGATIVE  No treatment or change in treatment [facility] ED lab result Strep Group A protocol.